# Patient Record
(demographics unavailable — no encounter records)

---

## 2024-10-18 NOTE — PHYSICAL EXAM
[Chaperone Present] : A chaperone was present in the examining room during all aspects of the physical examination [77222] : A chaperone was present during the pelvic exam. [FreeTextEntry2] : Brea

## 2024-10-18 NOTE — HISTORY OF PRESENT ILLNESS
[FreeTextEntry1] : Pleasure meeting Mala who is a very pleasant 33-year-old female who presents with urinary incontinence mixed type with significant stress component.  Incontinence has been present since the time of birth of first child vaginal delivery.  Currently using pads multiple changes during the day.  Nocturia x 1.  Urgency present but usually not the source of incontinent episodes.  No history of gross hematuria or recurrent urinary tract infection.  Patient has past history of pelvic floor dysfunction and has seen physical therapy in the past for this problem.  Does have the sensation of incomplete voiding in many instances.  No significant dyspareunia reported.  Patient's past medical history past surgical history and review of systems were reviewed today and can be found in the patient's electronic medical record.  Physical examination shows the patient to be alert oriented x 3 neuro grossly intact normal gait.  Abdominal exam showed no significant distention tympany masses or organomegaly.  No suprapubic tenderness identified.  PVR42 cc 42 cc by bladder scan.  No vaginal discharge present.  Introital mucosa normal in appearance.  No Q-tip tenderness.  Posterior floor without defect.  Palpation along the anterior vaginal vault showed no urethral bladder neck or bladder fundal tenderness or induration.  Significant urethral hypermobility and leakage with Valsalva in supine position (with low PVR as noted above).  Impression: Postpartum urinary incontinence dominant stress component.  Spoke with Dr. Muller regarding patient and to schedule UDS for further evaluation.  Urine studies sent off today.

## 2024-10-30 NOTE — ASSESSMENT
[FreeTextEntry1] : 33-year-old female who presents for mixed incontinence with stress predominance  UDS today revealed capacity 251,stress incontinence at 108 cc, no DO, normal flow curve, PVR 0.    Counseled the patient on options including pelvic floor PT, Bulkamid, mid urethral sling.  She is planning for potential childbearing so we discussed that we likely would not proceed with a sling.  For now she wants to try exercises and will consider Bulkamid.  Counseled about 75% effectiveness and possible need for reinjection.  Return to clinic in 3 months to reassess

## 2024-10-30 NOTE — REASON FOR VISIT
[TextEntry] : 33-year-old female who presents for mixed urinary incontinence with stress predominance  Patient reports that baseline she has struggled with urinary frequency.  She would go every 30 minutes since 2016.  She had nocturia 2-3 times.  She actually feels postpartum this is improved somewhat.  She is able to hold longer periods of time and only has nocturia x 1.  She is most bothered by her stress incontinence.  This occurs with walking, lifting her child, sneezing coughing.  She initially went to pelvic floor PT but has stopped.  She had 1 vaginal delivery 1 year ago.  This was complicated by episiotomy.  Her baby was 7 pounds. Ucx contam UA epith 8, 3 RBC

## 2024-11-01 NOTE — REASON FOR VISIT
[TextEntry] : 33-year-old female who presents for mixed urinary incontinence with stress predominance